# Patient Record
Sex: MALE | Race: WHITE | URBAN - METROPOLITAN AREA
[De-identification: names, ages, dates, MRNs, and addresses within clinical notes are randomized per-mention and may not be internally consistent; named-entity substitution may affect disease eponyms.]

---

## 2023-06-06 ENCOUNTER — OFFICE VISIT (OUTPATIENT)
Dept: URBAN - METROPOLITAN AREA CLINIC 48 | Facility: CLINIC | Age: 78
End: 2023-06-06

## 2023-06-06 DIAGNOSIS — H25.11 CATARACTA BRUNESCENS OF RIGHT EYE: ICD-10-CM

## 2023-06-06 DIAGNOSIS — H25.12 AGE-RELATED NUCLEAR CATARACT, LEFT EYE: ICD-10-CM

## 2023-06-06 DIAGNOSIS — H43.812 VITREOUS DEGENERATION, LEFT EYE: Primary | ICD-10-CM

## 2023-06-06 PROCEDURE — 76512 OPH US DX B-SCAN: CPT | Performed by: OPHTHALMOLOGY

## 2023-06-06 PROCEDURE — 99204 OFFICE O/P NEW MOD 45 MIN: CPT | Performed by: OPHTHALMOLOGY

## 2023-06-06 ASSESSMENT — INTRAOCULAR PRESSURE
OS: 11
OD: 11

## 2023-06-06 NOTE — IMPRESSION/PLAN
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  Plan: Appear to be visually significant, recommend CAT Eval

RTC 3wk CAT Rogelio w/ Dr. Marlin Miramontes or Dr. Bridger Singleton

## 2023-06-06 NOTE — IMPRESSION/PLAN
Impression: Vitreous degeneration, left eye: H43.812. Plan: Unable to depress to ORA due to poor DIL. Bscan ordered and performed - Posterior vitreous detachment accounts for the patient's complaints. There is no evidence of retinal pathology. All signs and risks of retinal detachment and tears were discussed in detail. Patient instructed to call the office immediately if any symptoms noted.   

RTC 3wk DE w/ Bscan

## 2023-06-29 ENCOUNTER — OFFICE VISIT (OUTPATIENT)
Dept: URBAN - METROPOLITAN AREA CLINIC 48 | Facility: CLINIC | Age: 78
End: 2023-06-29

## 2023-06-29 DIAGNOSIS — H25.11 CATARACTA BRUNESCENS OF RIGHT EYE: Primary | ICD-10-CM

## 2023-06-29 DIAGNOSIS — H25.12 AGE-RELATED NUCLEAR CATARACT, LEFT EYE: ICD-10-CM

## 2023-06-29 PROCEDURE — 92134 CPTRZ OPH DX IMG PST SGM RTA: CPT | Performed by: STUDENT IN AN ORGANIZED HEALTH CARE EDUCATION/TRAINING PROGRAM

## 2023-06-29 PROCEDURE — 99214 OFFICE O/P EST MOD 30 MIN: CPT | Performed by: STUDENT IN AN ORGANIZED HEALTH CARE EDUCATION/TRAINING PROGRAM

## 2023-06-29 ASSESSMENT — VISUAL ACUITY: OS: 20/30

## 2023-06-29 ASSESSMENT — INTRAOCULAR PRESSURE
OD: 12
OS: 12

## 2023-06-29 ASSESSMENT — KERATOMETRY
OD: 46.13
OS: 44.38

## 2023-06-29 NOTE — IMPRESSION/PLAN
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 Plan: The patient has a visually significant cataract in BOTH eyes. After discussion with the patient and careful examination it has been determined that a cataract in BOTH eyes is accounting for a significant amount of patient's visual symptoms. Cataract surgery and the associated risks, benefits, alternatives, expectations, and recovery were discussed in detail with the patient. All questions were answered. The patient understands that there may be some limitation in visual potential given any pre-existing ocular disease. Recovery may take slightly longer than normal due to advanced cataracts in OU. Plan for ERX drops. Schedule Cataract Surgery RIGHT eye then LEFT eye. RL2
POOR dilation, NO  previous LASIK surgery, NO prior alpha blockers, but still plan for Phenyleprine Discussed lens options with patient, patient candidate for Premium vs Toric vs Standard Lens. Patient chooses standard due to being self pay. 

Please schedule Ascan, then CE/IOL